# Patient Record
Sex: FEMALE | URBAN - METROPOLITAN AREA
[De-identification: names, ages, dates, MRNs, and addresses within clinical notes are randomized per-mention and may not be internally consistent; named-entity substitution may affect disease eponyms.]

---

## 2020-05-29 ENCOUNTER — APPOINTMENT (RX ONLY)
Dept: URBAN - METROPOLITAN AREA CLINIC 112 | Facility: CLINIC | Age: 35
Setting detail: DERMATOLOGY
End: 2020-05-29

## 2020-05-29 DIAGNOSIS — L23.9 ALLERGIC CONTACT DERMATITIS, UNSPECIFIED CAUSE: ICD-10-CM

## 2020-05-29 DIAGNOSIS — L90.6 STRIAE ATROPHICAE: ICD-10-CM

## 2020-05-29 PROCEDURE — ? PRESCRIPTION

## 2020-05-29 PROCEDURE — ? COUNSELING

## 2020-05-29 PROCEDURE — ? DIAGNOSIS COMMENT

## 2020-05-29 PROCEDURE — ? TREATMENT REGIMEN

## 2020-05-29 PROCEDURE — 99202 OFFICE O/P NEW SF 15 MIN: CPT | Mod: 95

## 2020-05-29 RX ORDER — TACROLIMUS 1 MG/G
OINTMENT TOPICAL BID
Qty: 1 | Refills: 1 | Status: CANCELLED | COMMUNITY
Start: 2020-05-29

## 2020-05-29 RX ADMIN — TACROLIMUS: 1 OINTMENT TOPICAL at 00:00

## 2020-05-29 ASSESSMENT — LOCATION DETAILED DESCRIPTION DERM
LOCATION DETAILED: LEFT POSTERIOR SHOULDER
LOCATION DETAILED: LEFT AXILLARY TAIL OF BREAST
LOCATION DETAILED: RIGHT AXILLARY VAULT

## 2020-05-29 ASSESSMENT — LOCATION ZONE DERM
LOCATION ZONE: AXILLAE
LOCATION ZONE: ARM
LOCATION ZONE: TRUNK

## 2020-05-29 ASSESSMENT — LOCATION SIMPLE DESCRIPTION DERM
LOCATION SIMPLE: RIGHT AXILLARY VAULT
LOCATION SIMPLE: LEFT SHOULDER
LOCATION SIMPLE: LEFT BREAST

## 2020-05-29 NOTE — HPI: RASH
What Type Of Note Output Would You Prefer (Optional)?: Bullet Format
How Severe Is Your Rash?: mild
Is This A New Presentation, Or A Follow-Up?: Rash
Additional History: Patient has been using triamcinolone 0.1% for her axillae after showering 5 days a week for the last 3 years. She notes that it helps the pruritus.\\n\\nShe uses dove soap in the shower and has tried various different deodorants. For the last 6 months, she has been using one with plant based deodorizers.

## 2020-05-29 NOTE — PROCEDURE: TREATMENT REGIMEN
Start Regimen: - Protopic 0.1% ointment: Apply to affected areas of axillae BID until resolved. If stinging occurs, put in fridge or dilute.\\n- OTC antihistamine (Zyrtec, Allegra, Xyzal) QAM\\n- Hydroxyzine 10 mg: Take 1-2 tablets PO QHS
Action 3: Continue
Other Instructions: Reviewed expectations of patch testing; task sent to billing to verify insurance benefits. If patch testing is cost prohibitive, discussed treating with Qbrexza to decrease sweating if the patient has irritant dermatitis\\n\\nFollow up in 1 month
Detail Level: Zone
Discontinue Regimen: - Triamcinolone 0.1% cream\\n- Diphenhydramine

## 2020-05-29 NOTE — PROCEDURE: COUNSELING
Detail Level: Zone
Patient Specific Counseling (Will Not Stick From Patient To Patient): Discussed possible association with use constant of moderate potency topical steroids for >3 years.
Detail Level: Detailed

## 2020-06-02 ENCOUNTER — RX ONLY (OUTPATIENT)
Age: 35
Setting detail: RX ONLY
End: 2020-06-02

## 2020-06-02 RX ORDER — TACROLIMUS 1 MG/G
OINTMENT TOPICAL BID
Qty: 1 | Refills: 1 | Status: ERX

## 2020-06-10 ENCOUNTER — APPOINTMENT (RX ONLY)
Dept: URBAN - METROPOLITAN AREA CLINIC 111 | Facility: CLINIC | Age: 35
Setting detail: DERMATOLOGY
End: 2020-06-10

## 2020-06-10 DIAGNOSIS — L23.7 ALLERGIC CONTACT DERMATITIS DUE TO PLANTS, EXCEPT FOOD: ICD-10-CM

## 2020-06-10 PROCEDURE — ? INTRAMUSCULAR KENALOG

## 2020-06-10 PROCEDURE — 96372 THER/PROPH/DIAG INJ SC/IM: CPT

## 2020-06-10 PROCEDURE — 99213 OFFICE O/P EST LOW 20 MIN: CPT | Mod: 25

## 2020-06-10 PROCEDURE — ? SEPARATE AND IDENTIFIABLE DOCUMENTATION

## 2020-06-10 PROCEDURE — ? COUNSELING

## 2020-06-10 ASSESSMENT — LOCATION SIMPLE DESCRIPTION DERM
LOCATION SIMPLE: LEFT BUTTOCK
LOCATION SIMPLE: ABDOMEN

## 2020-06-10 ASSESSMENT — LOCATION ZONE DERM: LOCATION ZONE: TRUNK

## 2020-06-10 ASSESSMENT — LOCATION DETAILED DESCRIPTION DERM
LOCATION DETAILED: RIGHT LATERAL ABDOMEN
LOCATION DETAILED: LEFT BUTTOCK

## 2020-06-10 NOTE — HPI: RASH
What Type Of Note Output Would You Prefer (Optional)?: Bullet Format
How Severe Is Your Rash?: moderate
Is This A New Presentation, Or A Follow-Up?: Rash
Additional History: Patient believes rash is scabies and that hydrocortisone is not helping condition.

## 2020-06-10 NOTE — PROCEDURE: INTRAMUSCULAR KENALOG
Total Volume (Ccs): 1.5
Expiration Date (Optional): oct 2021
Detail Level: None
Add Option For Additional Mediation: No
Lot # (Optional): [id number]
Kenalog Preparation: kenalog
Consent: The risks of IM Kenalog including weight gain, hunger, diabetes, increased blood sugars or blood pressure, osteoporosis, ulcers, sleeplessness, and psychiatric disturbance.
Administered By (Optional): ARTURO
Concentration (Mg/Ml): 40.0
Concentration (Mg/Ml) Of Additional Medication: 2.5

## 2020-07-13 ENCOUNTER — APPOINTMENT (RX ONLY)
Dept: URBAN - METROPOLITAN AREA CLINIC 111 | Facility: CLINIC | Age: 35
Setting detail: DERMATOLOGY
End: 2020-07-13

## 2020-07-13 DIAGNOSIS — L23.9 ALLERGIC CONTACT DERMATITIS, UNSPECIFIED CAUSE: ICD-10-CM

## 2020-07-13 PROCEDURE — 95044 PATCH/APPLICATION TESTS: CPT

## 2020-07-13 PROCEDURE — ? PATCH TESTING

## 2020-07-13 ASSESSMENT — LOCATION SIMPLE DESCRIPTION DERM: LOCATION SIMPLE: UPPER BACK

## 2020-07-13 ASSESSMENT — LOCATION DETAILED DESCRIPTION DERM: LOCATION DETAILED: INFERIOR THORACIC SPINE

## 2020-07-13 ASSESSMENT — LOCATION ZONE DERM: LOCATION ZONE: TRUNK

## 2020-07-13 NOTE — PROCEDURE: PATCH TESTING
Post-Care Instructions: I reviewed with the patient in detail post-care instructions. Patient should not sweat, pick at, or get the patches wet for 48 hours.
Number Of Patches (Maximum Allowable Per Dos By Cms Is 90): 80
Detail Level: Zone
Consent: Written consent obtained, risks reviewed including but not limited to rash, itching, allergic reaction, systemic rash, remote possiblity of anaphylaxis to allergen.

## 2020-07-15 ENCOUNTER — APPOINTMENT (RX ONLY)
Dept: URBAN - METROPOLITAN AREA CLINIC 112 | Facility: CLINIC | Age: 35
Setting detail: DERMATOLOGY
End: 2020-07-15

## 2020-07-15 DIAGNOSIS — L23.9 ALLERGIC CONTACT DERMATITIS, UNSPECIFIED CAUSE: ICD-10-CM

## 2020-07-15 PROCEDURE — ? NORTH AMERICAN 80 PATCH TEST READING

## 2020-07-15 PROCEDURE — 99213 OFFICE O/P EST LOW 20 MIN: CPT

## 2020-07-15 NOTE — PROCEDURE: NORTH AMERICAN 80 PATCH TEST READING
Name Of Allergen 61: Desoximetasone
Allergen 71 Reaction: no reaction
Name Of Allergen 37: 2-tert-Butyl-4-methoxyphenol (BHA)
Name Of Allergen 49: Tea Tree Oil
Name Of Allergen 7: Amerchol L101
Name Of Allergen 32: Thimerosal (Merthiolate)
Name Of Allergen 44: Tixocortol-21-pivalate
Name Of Allergen 56: DMDM Hydantoin
Name Of Allergen 72: Hydroxyisohexyl 3-Cyclohexene Carboxaldehyde (Lyral)
Name Of Allergen 2: 2-Mercaptobenzothiazole(MBT)
Name Of Allergen 19: Myroxylon pereirae resin/(Balsam [place])
Name Of Allergen 67: Lidocaine
Name Of Allergen 39: Ethyl acrylate
Name Of Allergen 51: Disperse Yellow 3
Name Of Allergen 63: Iodopropynyl butyl carbamate
Name Of Allergen 27: Methyldibromo glutaronitrile (MDBGN)
Name Of Allergen 14: Quaternium-15(Dowicil 200)/(1-(30Chloroallyl)-3,5,2-jldhep-3-azoniaadamantane chloride)
Name Of Allergen 79: Propylene glycol
Name Of Allergen 74: Hydroperoxides of Linalool
Name Of Allergen 9: Neomycin sulfate
Name Of Allergen 64: 2-n-Octyl-4-isothiazolin-3-one
Name Of Allergen 40: Glyceryl monothioglycolate (GMTG)
Name Of Allergen 52: Benzyl salicylate
Detail Level: Zone
Name Of Allergen 10: Thiuram mix
Name Of Allergen 35: Chloroxylenol (PCMX) / (4-Chloro-3,5-xylenol(PCMX)
Name Of Allergen 75: Amidoamine
What Reading Time Point?: 48 hour
Name Of Allergen 23: Bacitracin
Name Of Allergen 5: Imidazolidinyl urea Germall 115
Name Of Allergen 47: Triethanolamine
Name Of Allergen 59: Isopropyl myristate
Name Of Allergen 22: Tocopherol/ (DL alpha tocopherol)
Number Of Patches Read: 80
Name Of Allergen 54: Methylisothiazolinone
Name Of Allergen 70: Benzoylperoxide
Name Of Allergen 30: 2-Bromo-2-nitropropane-l,3-diol (Brononol)
Name Of Allergen 17: N,N-Diphenylguanidine
Name Of Allergen 42: Methyl methacrylate
Name Of Allergen 25: Disperse Orange 3
Name Of Allergen 77: Formaldehyde
Name Of Allergen 12: Ethylenediamine dihydrochloride
Name Of Allergen 1: Benzocaine
Name Of Allergen 31: Sesquiterpene lactone mix
Name Of Allergen 43: Cobalt (II) chloride hexahydrate
Name Of Allergen 55: Hema (2-Hydroxyethyl methacrylate)
Name Of Allergen 18: Potassium dichromate
Name Of Allergen 26: Paraben mix
Name Of Allergen 38: Gold(I)sodium thiosulfate dihydrate
Name Of Allergen 50: Fragrance Mix II
Name Of Allergen 78: Methylisothiazolinone + Methylchloroisothiazolinone/(Cl+-Me-isothiazolinone(Lucy )
Name Of Allergen 66: Compositae Mix II
Name Of Allergen 13: Epoxy resin Bisphenol A
Allergen 44 Reaction: +/-
Name Of Allergen 62: Polysorbate 80 / (Polyoxyethylenesorbitanmonooleate(Tween 80)
Name Of Allergen 73: Ethylhexyl Salicylate
Name Of Allergen 8: Carba mix
Name Of Allergen 20: Nickelsulfate hexahydrate
Name Of Allergen 33: Propolis
Name Of Allergen 45: B-033A Budesonide
Name Of Allergen 57: Cananga odorata oil/(Ylang-Ylang oil)
Name Of Allergen 3: Colophonium/(Colophony)
Name Of Allergen 68: Fusidic acid sodium salt
Name Of Allergen 15: 4-fsju-Ntmyhwrteugqpagnkcforcx resin
Name Of Allergen 28: Fragrance mix
Name Of Allergen 80: Oleamidopropyl dimethylamine
Name Of Allergen 21: Diazolidinylurea (Germall II)
Name Of Allergen 34: Benzophenone-3/ (2-Hydroxy-4-methoxybenzophenone)
Name Of Allergen 46: Cocamide JG/(Coconut diethanolamide)
Name Of Allergen 58: Benzyl alcohol
Name Of Allergen 4: p-Phenylenediamine(PPD)
Name Of Allergen 16: Mercapto mix
Name Of Allergen 41: Toluenesulfonamide formaldehyde resin
Name Of Allergen 69: Dibucaine hydrochloride
Name Of Allergen 29: Glutaral / (Glutaraldehyde)
Name Of Allergen 53: Decyl Glucoside
Name Of Allergen 65: Disperse Blue 106/124 Mix
Name Of Allergen 11: Clobetasol-17-propionate
Show Negative Results In The Note?: No
Name Of Allergen 48: Textile dye mix
Name Of Allergen 24: Mixed dialkyl thiourea
Name Of Allergen 76: Cocamidopropylbetaine
Name Of Allergen 36: Ethyleneurea, melamine formaldehyde mix
Name Of Allergen 6: Cinnamal/(Cinnamic aldehyde)
Name Of Allergen 60: Hydroperoxides of Limonene
Name Of Allergen 71: Isoamyl-p-methoxycinnamate

## 2020-07-17 ENCOUNTER — APPOINTMENT (RX ONLY)
Dept: URBAN - METROPOLITAN AREA CLINIC 112 | Facility: CLINIC | Age: 35
Setting detail: DERMATOLOGY
End: 2020-07-17

## 2020-07-17 DIAGNOSIS — L23.9 ALLERGIC CONTACT DERMATITIS, UNSPECIFIED CAUSE: ICD-10-CM

## 2020-07-17 PROCEDURE — ? NORTH AMERICAN 80 PATCH TEST READING

## 2020-07-17 PROCEDURE — ? PRESCRIPTION SAMPLES PROVIDED

## 2020-07-17 PROCEDURE — 99213 OFFICE O/P EST LOW 20 MIN: CPT

## 2020-07-17 PROCEDURE — ? ADDITIONAL NOTES

## 2020-07-17 PROCEDURE — ? TREATMENT REGIMEN

## 2020-07-17 NOTE — PROCEDURE: TREATMENT REGIMEN
Action 1: Continue
Show Cerave Line: Yes
Start Regimen: Tacrolimus ointment - Apply to the armpits BID for the next 2 weeks\\n\\nQbrexa - Apply to each armpit, 1 swipe per armpit with single wipe. Wash hands immediately.
Detail Level: Zone

## 2020-07-17 NOTE — PROCEDURE: NORTH AMERICAN 80 PATCH TEST READING
Name Of Allergen 61: Desoximetasone
Allergen 71 Reaction: no reaction
Name Of Allergen 37: 2-tert-Butyl-4-methoxyphenol (BHA)
Name Of Allergen 49: Tea Tree Oil
Name Of Allergen 7: Amerchol L101
Allergen 43 Reaction: 3+
Name Of Allergen 32: Thimerosal (Merthiolate)
Name Of Allergen 44: Tixocortol-21-pivalate
Name Of Allergen 56: DMDM Hydantoin
Name Of Allergen 72: Hydroxyisohexyl 3-Cyclohexene Carboxaldehyde (Lyral)
Name Of Allergen 2: 2-Mercaptobenzothiazole(MBT)
Name Of Allergen 19: Myroxylon pereirae resin/(Balsam [place])
Name Of Allergen 67: Lidocaine
Name Of Allergen 39: Ethyl acrylate
Name Of Allergen 51: Disperse Yellow 3
Name Of Allergen 63: Iodopropynyl butyl carbamate
Name Of Allergen 27: Methyldibromo glutaronitrile (MDBGN)
Name Of Allergen 14: Quaternium-15(Dowicil 200)/(1-(30Chloroallyl)-3,5,4-ontcob-3-azoniaadamantane chloride)
Name Of Allergen 79: Propylene glycol
Name Of Allergen 74: Hydroperoxides of Linalool
Name Of Allergen 9: Neomycin sulfate
Name Of Allergen 64: 2-n-Octyl-4-isothiazolin-3-one
Name Of Allergen 40: Glyceryl monothioglycolate (GMTG)
Name Of Allergen 52: Benzyl salicylate
Detail Level: Zone
Name Of Allergen 10: Thiuram mix
Name Of Allergen 35: Chloroxylenol (PCMX) / (4-Chloro-3,5-xylenol(PCMX)
Name Of Allergen 75: Amidoamine
What Reading Time Point?: 48 hour
Name Of Allergen 23: Bacitracin
Name Of Allergen 5: Imidazolidinyl urea Germall 115
Name Of Allergen 47: Triethanolamine
Name Of Allergen 59: Isopropyl myristate
Name Of Allergen 22: Tocopherol/ (DL alpha tocopherol)
Number Of Patches Read: 80
Name Of Allergen 54: Methylisothiazolinone
Name Of Allergen 70: Benzoylperoxide
Name Of Allergen 30: 2-Bromo-2-nitropropane-l,3-diol (Brononol)
Name Of Allergen 17: N,N-Diphenylguanidine
Name Of Allergen 42: Methyl methacrylate
Name Of Allergen 25: Disperse Orange 3
Name Of Allergen 77: Formaldehyde
Name Of Allergen 12: Ethylenediamine dihydrochloride
Name Of Allergen 1: Benzocaine
Name Of Allergen 31: Sesquiterpene lactone mix
Name Of Allergen 43: Cobalt (II) chloride hexahydrate
Name Of Allergen 55: Hema (2-Hydroxyethyl methacrylate)
Name Of Allergen 18: Potassium dichromate
Name Of Allergen 26: Paraben mix
Name Of Allergen 38: Gold(I)sodium thiosulfate dihydrate
Name Of Allergen 50: Fragrance Mix II
Name Of Allergen 78: Methylisothiazolinone + Methylchloroisothiazolinone/(Cl+-Me-isothiazolinone(Lucy )
Name Of Allergen 66: Compositae Mix II
Name Of Allergen 13: Epoxy resin Bisphenol A
Name Of Allergen 62: Polysorbate 80 / (Polyoxyethylenesorbitanmonooleate(Tween 80)
Name Of Allergen 73: Ethylhexyl Salicylate
Name Of Allergen 8: Carba mix
Name Of Allergen 20: Nickelsulfate hexahydrate
Name Of Allergen 33: Propolis
Name Of Allergen 45: B-033A Budesonide
Name Of Allergen 57: Cananga odorata oil/(Ylang-Ylang oil)
Name Of Allergen 3: Colophonium/(Colophony)
Name Of Allergen 68: Fusidic acid sodium salt
Name Of Allergen 15: 9-rlrj-Auvkxseaaapavkklgzcnrhf resin
Name Of Allergen 28: Fragrance mix
Name Of Allergen 80: Oleamidopropyl dimethylamine
Name Of Allergen 21: Diazolidinylurea (Germall II)
Name Of Allergen 34: Benzophenone-3/ (2-Hydroxy-4-methoxybenzophenone)
Name Of Allergen 46: Cocamide GJ/(Coconut diethanolamide)
Name Of Allergen 58: Benzyl alcohol
Name Of Allergen 4: p-Phenylenediamine(PPD)
Name Of Allergen 16: Mercapto mix
Name Of Allergen 41: Toluenesulfonamide formaldehyde resin
Name Of Allergen 69: Dibucaine hydrochloride
Name Of Allergen 29: Glutaral / (Glutaraldehyde)
Name Of Allergen 53: Decyl Glucoside
Name Of Allergen 65: Disperse Blue 106/124 Mix
Name Of Allergen 11: Clobetasol-17-propionate
Show Negative Results In The Note?: No
Name Of Allergen 48: Textile dye mix
Name Of Allergen 24: Mixed dialkyl thiourea
Name Of Allergen 76: Cocamidopropylbetaine
Name Of Allergen 36: Ethyleneurea, melamine formaldehyde mix
Name Of Allergen 6: Cinnamal/(Cinnamic aldehyde)
Name Of Allergen 60: Hydroperoxides of Limonene
Name Of Allergen 71: Isoamyl-p-methoxycinnamate

## 2020-07-17 NOTE — PROCEDURE: ADDITIONAL NOTES
Detail Level: Simple
Additional Notes: Due to shifting of patches during the first 48 hours, it is difficult to decipher which. Patient provided with info sheets for the 2 suspected allergens.

## 2020-08-21 ENCOUNTER — APPOINTMENT (RX ONLY)
Dept: URBAN - METROPOLITAN AREA CLINIC 112 | Facility: CLINIC | Age: 35
Setting detail: DERMATOLOGY
End: 2020-08-21

## 2020-08-21 DIAGNOSIS — L23.9 ALLERGIC CONTACT DERMATITIS, UNSPECIFIED CAUSE: ICD-10-CM | Status: INADEQUATELY CONTROLLED

## 2020-08-21 PROCEDURE — ? COUNSELING

## 2020-08-21 PROCEDURE — 99213 OFFICE O/P EST LOW 20 MIN: CPT | Mod: 95

## 2020-08-21 PROCEDURE — ? PRESCRIPTION

## 2020-08-21 PROCEDURE — ? TREATMENT REGIMEN

## 2020-08-21 RX ORDER — KETOCONAZOLE 20 MG/G
CREAM TOPICAL
Qty: 1 | Refills: 1 | Status: ERX | COMMUNITY
Start: 2020-08-21

## 2020-08-21 RX ORDER — HYDROCORTISONE 25 MG/G
CREAM TOPICAL
Qty: 1 | Refills: 1 | Status: ERX | COMMUNITY
Start: 2020-08-21

## 2020-08-21 RX ADMIN — KETOCONAZOLE: 20 CREAM TOPICAL at 00:00

## 2020-08-21 RX ADMIN — HYDROCORTISONE: 25 CREAM TOPICAL at 00:00

## 2020-08-21 NOTE — PROCEDURE: TREATMENT REGIMEN
Action 1: Continue
Show Cerave Line: Yes
Start Regimen: Hydrocortisone 2.5% & ketoconazole cream - mix and apply to the axillae BID, use hydrocortisone 2 weeks on 1 week off.  Use tacrolimus during week off of steroids.
Detail Level: Zone
Continue Regimen: Qbrexa - Apply 1 wipe to each armpit once weekly, wash hands immediately after applications (discussed potential side effects.  Dry mouth will likely improve with time)

## 2020-12-16 ENCOUNTER — APPOINTMENT (RX ONLY)
Dept: URBAN - METROPOLITAN AREA CLINIC 112 | Facility: CLINIC | Age: 35
Setting detail: DERMATOLOGY
End: 2020-12-16

## 2020-12-16 DIAGNOSIS — L72.0 EPIDERMAL CYST: ICD-10-CM

## 2020-12-16 DIAGNOSIS — L23.9 ALLERGIC CONTACT DERMATITIS, UNSPECIFIED CAUSE: ICD-10-CM

## 2020-12-16 PROCEDURE — ? COUNSELING

## 2020-12-16 PROCEDURE — 99213 OFFICE O/P EST LOW 20 MIN: CPT

## 2020-12-16 PROCEDURE — ? TREATMENT REGIMEN

## 2020-12-16 PROCEDURE — ? PRESCRIPTION

## 2020-12-16 PROCEDURE — ? OBSERVATION AND MEASURE

## 2020-12-16 RX ORDER — TACROLIMUS 1 MG/G
OINTMENT TOPICAL
Qty: 1 | Refills: 2 | Status: ERX | COMMUNITY
Start: 2020-12-16

## 2020-12-16 RX ADMIN — TACROLIMUS: 1 OINTMENT TOPICAL at 00:00

## 2020-12-16 ASSESSMENT — LOCATION DETAILED DESCRIPTION DERM
LOCATION DETAILED: RIGHT POSTAURICULAR CREASE
LOCATION DETAILED: LEFT AXILLARY VAULT

## 2020-12-16 ASSESSMENT — LOCATION SIMPLE DESCRIPTION DERM
LOCATION SIMPLE: RIGHT EAR
LOCATION SIMPLE: LEFT AXILLARY VAULT

## 2020-12-16 ASSESSMENT — LOCATION ZONE DERM
LOCATION ZONE: AXILLAE
LOCATION ZONE: EAR

## 2020-12-16 NOTE — HPI: SKIN LESION
Is This A New Presentation, Or A Follow-Up?: Skin Lesion
What Type Of Note Output Would You Prefer (Optional)?: Bullet Format
How Severe Is Your Skin Lesion?: mild
Additional History: Possible cyst.

## 2020-12-16 NOTE — PROCEDURE: TREATMENT REGIMEN
Start Regimen: - Ketoconazole cream: Apply to affected areas of the axillae if rash is not improving with just Protopic (rx previously sent)
Other Instructions: Follow up prn
Action 3: Continue
Continue Regimen: - Protopic 0.1% ointment: Apply to affected areas of axillae BID prn flares\\n- Qbrexza: Wipe under arms as needed for sweating
Detail Level: Zone
Plan: Patient would like to consider excision due to hx of irritation and pain. Advised patient to follow up with Dr. Conley for excision. Task sent to [facility] [place]

## 2020-12-29 RX ORDER — TACROLIMUS 1 MG/G
OINTMENT TOPICAL
Qty: 3 | Refills: 0 | Status: ERX

## 2021-02-25 ENCOUNTER — APPOINTMENT (RX ONLY)
Dept: URBAN - METROPOLITAN AREA CLINIC 112 | Facility: CLINIC | Age: 36
Setting detail: DERMATOLOGY
End: 2021-02-25

## 2021-02-25 DIAGNOSIS — D485 NEOPLASM OF UNCERTAIN BEHAVIOR OF SKIN: ICD-10-CM

## 2021-02-25 PROBLEM — D48.5 NEOPLASM OF UNCERTAIN BEHAVIOR OF SKIN: Status: ACTIVE | Noted: 2021-02-25

## 2021-02-25 PROCEDURE — 11441 EXC FACE-MM B9+MARG 0.6-1 CM: CPT

## 2021-02-25 PROCEDURE — ? PUNCH EXCISION

## 2021-02-25 ASSESSMENT — LOCATION SIMPLE DESCRIPTION DERM: LOCATION SIMPLE: RIGHT EAR

## 2021-02-25 ASSESSMENT — LOCATION DETAILED DESCRIPTION DERM: LOCATION DETAILED: RIGHT POSTERIOR EAR

## 2021-02-25 ASSESSMENT — LOCATION ZONE DERM: LOCATION ZONE: EAR

## 2021-02-25 NOTE — PROCEDURE: PUNCH EXCISION
Medical Necessity Clause: This procedure was medically necessary because the lesion that was treated was:
Body Location Override (Optional - Billing Will Still Be Based On Selected Body Map Location If Applicable): right postauricular crease
Detail Level: Detailed
Referring Physician (Optional): Jennifer Louis PA-C
Size Of Lesion (*Required): 0.6
X Size Of Lesion Width In Cm (Optional): 0
Punch Size In Mm: 5
Repair Type: None (Simple)
Intermediate / Complex Repair - Final Wound Length In Cm: 0.5
Complex Requirements: Extensive Undermining Performed?: No
Undermining Type: Entire Wound
Debridement Text: The wound edges were debrided prior to proceeding with the closure to facilitate wound healing.
Helical Rim Text: The closure involved the helical rim.
Vermilion Border Text: The closure involved the vermilion border.
Nostril Rim Text: The closure involved the nostril rim.
Retention Suture Text: Retention sutures were placed to support the closure and prevent dehiscence.
Include Undermining Statement In The Repair Note?: Yes
Anesthesia Type: 1% lidocaine without epinephrine
Anesthesia Volume In Cc: 2
Hemostasis: Electrocautery
Epidermal Sutures: 4-0 Prolene
Number Of Epidermal Sutures (Optional): 3
Epidermal Closure: simple interrupted
Wound Care: Vaseline
Wound Dressings: steri-strips and pressure dressing
Suture Removal: 14 days
Lab: [id number]
Lab Facility: [id number]
Path Notes (To The Dermatopathologist): Please check margins.
1.5 Mm Punch Excision Text: A 1.5 mm punch biopsy was used to excise the lesion to the level of the subcutaneous fat.  Blunt dissection was used to free the lesion from the surrounding tissues and the lesion was removed.
2 Mm Punch Excision Text: A 2 mm punch biopsy was used to excise the lesion to the level of the subcutaneous fat.  Blunt dissection was used to free the lesion from the surrounding tissues and the lesion was removed.
2.5 Mm Punch Excision Text: A 2.5 mm punch biopsy was used to excise the lesion to the level of the subcutaneous fat.  Blunt dissection was used to free the lesion from the surrounding tissues and the lesion was removed.
3 Mm Punch Excision Text: A 3 mm punch biopsy was used to excise the lesion to the level of the subcutaneous fat.  Blunt dissection was used to free the lesion from the surrounding tissues and the lesion was removed.
3.5 Mm Punch Excision Text: A 3.5 mm punch biopsy was used to excise the lesion to the level of the subcutaneous fat.  Blunt dissection was used to free the lesion from the surrounding tissues and the lesion was removed.
4 Mm Punch Excision Text: A 4 mm punch biopsy was used to excise the lesion to the level of the subcutaneous fat.  Blunt dissection was used to free the lesion from the surrounding tissues and the lesion was removed.
4.5 Mm Punch Excision Text: A 4.5 mm punch biopsy was used to excise the lesion to the level of the subcutaneous fat.  Blunt dissection was used to free the lesion from the surrounding tissues and the lesion was removed.
5 Mm Punch Excision Text: A 5 mm punch biopsy was used to excise the lesion to the level of the subcutaneous fat.  Blunt dissection was used to free the lesion from the surrounding tissues and the lesion was removed.
6 Mm Punch Excision Text: A 6 mm punch biopsy was used to excise the lesion to the level of the subcutaneous fat.  Blunt dissection was used to free the lesion from the surrounding tissues and the lesion was removed.
7 Mm Punch Excision Text: A 7 mm punch biopsy was used to excise the lesion to the level of the subcutaneous fat.  Blunt dissection was used to free the lesion from the surrounding tissues and the lesion was removed.
8 Mm Punch Excision Text: A 8 mm punch biopsy was used to excise the lesion to the level of the subcutaneous fat.  Blunt dissection was used to free the lesion from the surrounding tissues and the lesion was removed.
10 Mm Punch Excision Text: A 10 mm punch biopsy was used to excise the lesion to the level of the subcutaneous fat.  Blunt dissection was used to free the lesion from the surrounding tissues and the lesion was removed.
12 Mm Punch Excision Text: A 12 mm punch biopsy was used to excise the lesion to the level of the subcutaneous fat.  Blunt dissection was used to free the lesion from the surrounding tissues and the lesion was removed.
Consent was obtained from the patient. The risks and benefits to therapy were discussed in detail. Specifically, the risks of infection, scarring, bleeding, prolonged wound healing, incomplete removal, allergy to anesthesia, nerve injury and recurrence were addressed. Prior to the procedure, the treatment site was clearly identified and confirmed by the patient. All components of Universal Protocol/PAUSE Rule completed.
Post-Care Instructions: I reviewed with the patient in detail post-care instructions. Patient is to keep the biopsy site dry overnight, and then apply a bland emollient such as Vaseline or Aquaphor and a bandage daily until healed.
Notification Instructions: Patient will be notified of biopsy results. However, patient instructed to call the office if not contacted within 2 weeks.
Billing Type: Third-Party Bill

## 2021-03-09 ENCOUNTER — APPOINTMENT (RX ONLY)
Dept: URBAN - METROPOLITAN AREA CLINIC 112 | Facility: CLINIC | Age: 36
Setting detail: DERMATOLOGY
End: 2021-03-09

## 2021-03-09 DIAGNOSIS — Z48.02 ENCOUNTER FOR REMOVAL OF SUTURES: ICD-10-CM

## 2021-03-09 PROCEDURE — ? SUTURE REMOVAL (GLOBAL PERIOD)

## 2021-03-09 ASSESSMENT — LOCATION ZONE DERM: LOCATION ZONE: EAR

## 2021-03-09 ASSESSMENT — LOCATION SIMPLE DESCRIPTION DERM: LOCATION SIMPLE: RIGHT EAR

## 2021-03-09 ASSESSMENT — LOCATION DETAILED DESCRIPTION DERM: LOCATION DETAILED: RIGHT POSTAURICULAR CREASE

## 2021-03-09 NOTE — PROCEDURE: SUTURE REMOVAL (GLOBAL PERIOD)
Detail Level: Detailed
Add 97009 Cpt? (Important Note: In 2017 The Use Of 38633 Is Being Tracked By Cms To Determine Future Global Period Reimbursement For Global Periods): no